# Patient Record
Sex: MALE | Race: BLACK OR AFRICAN AMERICAN | NOT HISPANIC OR LATINO | Employment: STUDENT | URBAN - METROPOLITAN AREA
[De-identification: names, ages, dates, MRNs, and addresses within clinical notes are randomized per-mention and may not be internally consistent; named-entity substitution may affect disease eponyms.]

---

## 2023-01-31 ENCOUNTER — CLINICAL SUPPORT (OUTPATIENT)
Dept: URGENT CARE | Facility: CLINIC | Age: 23
End: 2023-01-31

## 2023-01-31 DIAGNOSIS — Z11.59 SCREENING FOR VIRAL DISEASE: Primary | ICD-10-CM

## 2023-01-31 LAB
CTP QC/QA: YES
SARS-COV-2 AG RESP QL IA.RAPID: NEGATIVE

## 2023-01-31 PROCEDURE — 87811 SARS-COV-2 COVID19 W/OPTIC: CPT | Mod: QW,S$GLB,, | Performed by: INTERNAL MEDICINE

## 2023-01-31 PROCEDURE — 87811 SARS CORONAVIRUS 2 ANTIGEN POCT, MANUAL READ: ICD-10-PCS | Mod: QW,S$GLB,, | Performed by: INTERNAL MEDICINE

## 2023-02-16 ENCOUNTER — OFFICE VISIT (OUTPATIENT)
Dept: URGENT CARE | Facility: CLINIC | Age: 23
End: 2023-02-16

## 2023-02-16 VITALS
HEART RATE: 65 BPM | HEIGHT: 67 IN | OXYGEN SATURATION: 98 % | SYSTOLIC BLOOD PRESSURE: 107 MMHG | RESPIRATION RATE: 18 BRPM | TEMPERATURE: 98 F | DIASTOLIC BLOOD PRESSURE: 73 MMHG | WEIGHT: 144 LBS | BODY MASS INDEX: 22.6 KG/M2

## 2023-02-16 DIAGNOSIS — A54.9 GONORRHEA: Primary | ICD-10-CM

## 2023-02-16 PROCEDURE — 99212 PR OFFICE/OUTPT VISIT, EST, LEVL II, 10-19 MIN: ICD-10-PCS | Mod: GT,25,S$GLB,

## 2023-02-16 PROCEDURE — 96372 PR INJECTION,THERAP/PROPH/DIAG2ST, IM OR SUBCUT: ICD-10-PCS | Mod: S$GLB,,,

## 2023-02-16 PROCEDURE — 99212 OFFICE O/P EST SF 10 MIN: CPT | Mod: GT,25,S$GLB,

## 2023-02-16 PROCEDURE — 96372 THER/PROPH/DIAG INJ SC/IM: CPT | Mod: S$GLB,,,

## 2023-02-16 RX ORDER — DARUNAVIR, COBICISTAT, EMTRICITABINE, AND TENOFOVIR ALAFENAMIDE 800; 150; 200; 10 MG/1; MG/1; MG/1; MG/1
1 TABLET, FILM COATED ORAL
COMMUNITY
Start: 2023-01-26

## 2023-02-16 RX ORDER — CEFTRIAXONE 500 MG/1
500 INJECTION, POWDER, FOR SOLUTION INTRAMUSCULAR; INTRAVENOUS
Status: COMPLETED | OUTPATIENT
Start: 2023-02-16 | End: 2023-02-16

## 2023-02-16 RX ADMIN — CEFTRIAXONE 500 MG: 500 INJECTION, POWDER, FOR SOLUTION INTRAMUSCULAR; INTRAVENOUS at 11:02

## 2023-02-16 NOTE — PATIENT INSTRUCTIONS
Please use condoms   No sexual intercourse for 14 days.  Please follow-up with primary care provider in 2-3 months for retesting

## 2023-02-16 NOTE — PROGRESS NOTES
"Subjective:       Patient ID: Solo Cagle is a 22 y.o. male.    Vitals:  height is 5' 7" (1.702 m) and weight is 65.3 kg (144 lb). His oral temperature is 98.2 °F (36.8 °C). His blood pressure is 107/73 and his pulse is 65. His respiration is 18 and oxygen saturation is 98%.     Chief Complaint: Exposure to STD    Pt presents to [facility] after testing positive for gonorrhea on 02/15/2023 w/ [facility] (results scanned in)  Pt states he did not have any symptoms when he initially tested on 02/09/2023 - states his partner told him that he was positive for gonorrhea.     Exposure to STD  This is a new problem. The current episode started in the past 7 days. The problem has been unchanged. Associated symptoms include abdominal pain. He is sexually active. He inconsistently uses condoms. Yes, his partner has an STD. His past medical history is significant for HIV.     Gastrointestinal:  Positive for abdominal pain.     Objective:      Physical Exam   Constitutional: He is oriented to person, place, and time. He appears well-developed. He is cooperative.   HENT:   Head: Normocephalic and atraumatic.   Ears:   Right Ear: Hearing normal.   Left Ear: Hearing and ear canal normal.   Nose: Nose normal.   Eyes: Conjunctivae and lids are normal.   Neck: Trachea normal and phonation normal. Neck supple.   Abdominal: Normal appearance.   Neurological: He is alert and oriented to person, place, and time. He exhibits normal muscle tone.   Skin: Skin is intact.   Psychiatric: His speech is normal and behavior is normal. Judgment and thought content normal.   Nursing note and vitals reviewed.      Assessment:       1. Gonorrhea          Plan:         Gonorrhea  -     cefTRIAXone injection 500 mg         The patient location is: [facility]  The chief complaint leading to consultation is: gonorrhea    Visit type: audiovisual    Face to Face time with patient: 10  10 minutes of total time spent on the encounter, which includes " face to face time and non-face to face time preparing to see the patient (eg, review of tests), Obtaining and/or reviewing separately obtained history, Documenting clinical information in the electronic or other health record, Independently interpreting results (not separately reported) and communicating results to the patient/family/caregiver, or Care coordination (not separately reported).         Each patient to whom he or she provides medical services by telemedicine is:  (1) informed of the relationship between the physician and patient and the respective role of any other health care provider with respect to management of the patient; and (2) notified that he or she may decline to receive medical services by telemedicine and may withdraw from such care at any time.    Notes:    Medical Decision Making:   Initial Assessment:   PT in room AAOX4, skin W/D, resp E/U, non toxic in appearance, NAD.    Urgent Care Management:  Patient at clinic requesting treatment for gonorrhea. PT denies any symptoms.  Discussed with patient IM Rocephin in clinic patient agreed.  Discussed with patient to refrain from sexual activity for 14 days after treatment as well as to inform partners of need to get tested and possible treatment.  Discussed with patient to follow-up primary care provider patient had no further questions. ED precautions given.  Patient also had positive gonorrhea results another clinic the patient received.

## 2023-07-11 ENCOUNTER — PATIENT MESSAGE (OUTPATIENT)
Dept: RESEARCH | Facility: HOSPITAL | Age: 23
End: 2023-07-11